# Patient Record
Sex: MALE | Race: WHITE | NOT HISPANIC OR LATINO | Employment: FULL TIME | URBAN - METROPOLITAN AREA
[De-identification: names, ages, dates, MRNs, and addresses within clinical notes are randomized per-mention and may not be internally consistent; named-entity substitution may affect disease eponyms.]

---

## 2017-10-23 ENCOUNTER — GENERIC CONVERSION - ENCOUNTER (OUTPATIENT)
Dept: OTHER | Facility: OTHER | Age: 57
End: 2017-10-23

## 2017-11-20 ENCOUNTER — ALLSCRIPTS OFFICE VISIT (OUTPATIENT)
Dept: OTHER | Facility: OTHER | Age: 57
End: 2017-11-20

## 2017-11-20 DIAGNOSIS — R39.15 URGENCY OF URINATION: ICD-10-CM

## 2017-11-20 DIAGNOSIS — E66.9 OBESITY: ICD-10-CM

## 2017-11-20 DIAGNOSIS — Z12.5 ENCOUNTER FOR SCREENING FOR MALIGNANT NEOPLASM OF PROSTATE: ICD-10-CM

## 2017-11-20 DIAGNOSIS — N40.0 ENLARGED PROSTATE WITHOUT LOWER URINARY TRACT SYMPTOMS (LUTS): ICD-10-CM

## 2017-11-20 DIAGNOSIS — E78.1 PURE HYPERGLYCERIDEMIA: ICD-10-CM

## 2017-11-20 LAB
BILIRUB UR QL STRIP: NEGATIVE
CLARITY UR: NORMAL
COLOR UR: YELLOW
GLUCOSE (HISTORICAL): NEGATIVE
HGB UR QL STRIP.AUTO: NORMAL
KETONES UR STRIP-MCNC: NEGATIVE MG/DL
LEUKOCYTE ESTERASE UR QL STRIP: NEGATIVE
NITRITE UR QL STRIP: NEGATIVE
PH UR STRIP.AUTO: 6 [PH]
PROT UR STRIP-MCNC: NEGATIVE MG/DL
SP GR UR STRIP.AUTO: 1.02
UROBILINOGEN UR QL STRIP.AUTO: 0.2

## 2017-11-30 ENCOUNTER — APPOINTMENT (OUTPATIENT)
Dept: LAB | Facility: HOSPITAL | Age: 57
End: 2017-11-30
Attending: INTERNAL MEDICINE
Payer: COMMERCIAL

## 2017-11-30 DIAGNOSIS — R39.15 URGENCY OF URINATION: ICD-10-CM

## 2017-11-30 DIAGNOSIS — E78.1 PURE HYPERGLYCERIDEMIA: ICD-10-CM

## 2017-11-30 DIAGNOSIS — E66.9 OBESITY: ICD-10-CM

## 2017-11-30 DIAGNOSIS — N40.0 ENLARGED PROSTATE WITHOUT LOWER URINARY TRACT SYMPTOMS (LUTS): ICD-10-CM

## 2017-11-30 DIAGNOSIS — Z12.5 ENCOUNTER FOR SCREENING FOR MALIGNANT NEOPLASM OF PROSTATE: ICD-10-CM

## 2017-11-30 LAB
ALBUMIN SERPL BCP-MCNC: 4 G/DL (ref 3.5–5)
ALP SERPL-CCNC: 66 U/L (ref 46–116)
ALT SERPL W P-5'-P-CCNC: 29 U/L (ref 12–78)
ANION GAP SERPL CALCULATED.3IONS-SCNC: 4 MMOL/L (ref 4–13)
AST SERPL W P-5'-P-CCNC: 23 U/L (ref 5–45)
BILIRUB SERPL-MCNC: 0.55 MG/DL (ref 0.2–1)
BUN SERPL-MCNC: 17 MG/DL (ref 5–25)
CALCIUM SERPL-MCNC: 9.5 MG/DL (ref 8.3–10.1)
CHLORIDE SERPL-SCNC: 107 MMOL/L (ref 100–108)
CHOLEST SERPL-MCNC: 169 MG/DL (ref 50–200)
CO2 SERPL-SCNC: 30 MMOL/L (ref 21–32)
CREAT SERPL-MCNC: 1.19 MG/DL (ref 0.6–1.3)
ERYTHROCYTE [DISTWIDTH] IN BLOOD BY AUTOMATED COUNT: 13.9 % (ref 11.6–15.1)
EST. AVERAGE GLUCOSE BLD GHB EST-MCNC: 114 MG/DL
GFR SERPL CREATININE-BSD FRML MDRD: 66 ML/MIN/1.73SQ M
GLUCOSE P FAST SERPL-MCNC: 94 MG/DL (ref 65–99)
HBA1C MFR BLD: 5.6 % (ref 4.2–6.3)
HCT VFR BLD AUTO: 45.6 % (ref 36.5–49.3)
HDLC SERPL-MCNC: 45 MG/DL (ref 40–60)
HGB BLD-MCNC: 15.4 G/DL (ref 12–17)
LDLC SERPL CALC-MCNC: 99 MG/DL (ref 0–100)
MCH RBC QN AUTO: 28.6 PG (ref 26.8–34.3)
MCHC RBC AUTO-ENTMCNC: 33.8 G/DL (ref 31.4–37.4)
MCV RBC AUTO: 85 FL (ref 82–98)
PLATELET # BLD AUTO: 230 THOUSANDS/UL (ref 149–390)
PMV BLD AUTO: 11.4 FL (ref 8.9–12.7)
POTASSIUM SERPL-SCNC: 5 MMOL/L (ref 3.5–5.3)
PROT SERPL-MCNC: 7 G/DL (ref 6.4–8.2)
PSA SERPL-MCNC: 0.9 NG/ML (ref 0–4)
RBC # BLD AUTO: 5.38 MILLION/UL (ref 3.88–5.62)
SODIUM SERPL-SCNC: 141 MMOL/L (ref 136–145)
TRIGL SERPL-MCNC: 123 MG/DL
WBC # BLD AUTO: 6.95 THOUSAND/UL (ref 4.31–10.16)

## 2017-11-30 PROCEDURE — 80053 COMPREHEN METABOLIC PANEL: CPT

## 2017-11-30 PROCEDURE — 36415 COLL VENOUS BLD VENIPUNCTURE: CPT

## 2017-11-30 PROCEDURE — 85027 COMPLETE CBC AUTOMATED: CPT

## 2017-11-30 PROCEDURE — 83036 HEMOGLOBIN GLYCOSYLATED A1C: CPT

## 2017-11-30 PROCEDURE — G0103 PSA SCREENING: HCPCS

## 2017-11-30 PROCEDURE — 80061 LIPID PANEL: CPT

## 2018-01-03 ENCOUNTER — GENERIC CONVERSION - ENCOUNTER (OUTPATIENT)
Dept: OTHER | Facility: OTHER | Age: 58
End: 2018-01-03

## 2018-01-14 VITALS
TEMPERATURE: 98.1 F | DIASTOLIC BLOOD PRESSURE: 80 MMHG | OXYGEN SATURATION: 98 % | BODY MASS INDEX: 38.66 KG/M2 | SYSTOLIC BLOOD PRESSURE: 116 MMHG | HEIGHT: 69 IN | HEART RATE: 68 BPM | WEIGHT: 261 LBS

## 2018-01-22 VITALS
DIASTOLIC BLOOD PRESSURE: 78 MMHG | SYSTOLIC BLOOD PRESSURE: 124 MMHG | HEIGHT: 69 IN | BODY MASS INDEX: 38.95 KG/M2 | TEMPERATURE: 98.6 F | OXYGEN SATURATION: 98 % | WEIGHT: 263 LBS | HEART RATE: 60 BPM

## 2018-01-24 VITALS
WEIGHT: 272.5 LBS | TEMPERATURE: 98.5 F | SYSTOLIC BLOOD PRESSURE: 120 MMHG | OXYGEN SATURATION: 96 % | HEIGHT: 69 IN | HEART RATE: 73 BPM | DIASTOLIC BLOOD PRESSURE: 72 MMHG | BODY MASS INDEX: 40.36 KG/M2

## 2018-01-28 ENCOUNTER — OFFICE VISIT (OUTPATIENT)
Dept: URGENT CARE | Age: 58
End: 2018-01-28
Payer: COMMERCIAL

## 2018-01-28 VITALS
DIASTOLIC BLOOD PRESSURE: 84 MMHG | TEMPERATURE: 98.2 F | SYSTOLIC BLOOD PRESSURE: 150 MMHG | HEIGHT: 70 IN | OXYGEN SATURATION: 96 % | RESPIRATION RATE: 20 BRPM | BODY MASS INDEX: 38.51 KG/M2 | WEIGHT: 269 LBS | HEART RATE: 62 BPM

## 2018-01-28 DIAGNOSIS — J20.9 ACUTE BRONCHITIS, UNSPECIFIED ORGANISM: Primary | ICD-10-CM

## 2018-01-28 PROCEDURE — S9083 URGENT CARE CENTER GLOBAL: HCPCS | Performed by: FAMILY MEDICINE

## 2018-01-28 PROCEDURE — G0382 LEV 3 HOSP TYPE B ED VISIT: HCPCS | Performed by: FAMILY MEDICINE

## 2018-01-28 RX ORDER — ALBUTEROL SULFATE 90 UG/1
2 AEROSOL, METERED RESPIRATORY (INHALATION) EVERY 6 HOURS PRN
Qty: 1 INHALER | Refills: 0 | Status: SHIPPED | OUTPATIENT
Start: 2018-01-28 | End: 2018-06-15 | Stop reason: ALTCHOICE

## 2018-01-28 RX ORDER — AZITHROMYCIN 250 MG/1
TABLET, FILM COATED ORAL
Qty: 6 TABLET | Refills: 0 | Status: SHIPPED | OUTPATIENT
Start: 2018-01-28 | End: 2018-02-01

## 2018-01-28 RX ORDER — TAMSULOSIN HYDROCHLORIDE 0.4 MG/1
1 CAPSULE ORAL
COMMUNITY
Start: 2017-11-20 | End: 2018-06-15 | Stop reason: ALTCHOICE

## 2018-01-28 RX ORDER — CYCLOBENZAPRINE HCL 5 MG
1 TABLET ORAL 3 TIMES DAILY PRN
COMMUNITY
Start: 2018-01-03 | End: 2018-06-15 | Stop reason: ALTCHOICE

## 2018-01-28 RX ORDER — ASPIRIN 81 MG/1
TABLET, CHEWABLE ORAL
COMMUNITY
End: 2018-07-30 | Stop reason: SDUPTHER

## 2018-01-28 NOTE — PROGRESS NOTES
Assessment/Plan:      Diagnoses and all orders for this visit:    Acute bronchitis, unspecified organism  -     azithromycin (ZITHROMAX) 250 mg tablet; Take 2 tablets day 1 then 1 tab days 2-5  -     albuterol (PROVENTIL HFA,VENTOLIN HFA) 90 mcg/act inhaler; Inhale 2 puffs every 6 (six) hours as needed for wheezing    Other orders  -     aspirin 81 mg chewable tablet; Chew  -     Multiple Vitamins-Minerals ([facility] DAILY MULTIPLE FOR MEN PO); Take by mouth  -     cyclobenzaprine (FLEXERIL) 5 mg tablet; Take 1 tablet by mouth 3 (three) times a day as needed  -     tamsulosin (FLOMAX) 0 4 mg; Take 1 capsule by mouth          Subjective:     Patient ID: Isidro Hayes is a 59 y o  male  Patient is here for evaluation of cough, chest congestion which started on Thursday in getting worse  Patient bring up thick green yellow sputum  He denies any fevers, chills, body aches, shortness of breath, sinus pressure  Patient does have some discomfort in both ears left greater than right  Review of Systems   Constitutional: Negative  HENT: Positive for ear pain  Eyes: Negative  Respiratory: Positive for cough and wheezing  Negative for choking, shortness of breath and stridor  Cardiovascular: Negative  Objective:     Physical Exam   Constitutional: He is oriented to person, place, and time  He appears well-developed and well-nourished  No distress  HENT:   Head: Normocephalic and atraumatic  Eyes: Conjunctivae and EOM are normal  Pupils are equal, round, and reactive to light  Right eye exhibits no discharge  Left eye exhibits no discharge  Neck: Normal range of motion  Neck supple  Cardiovascular: Normal rate, regular rhythm and normal heart sounds  Pulmonary/Chest: Effort normal  No respiratory distress  He has wheezes  He has no rales  He exhibits no tenderness  Neurological: He is alert and oriented to person, place, and time  Skin: He is not diaphoretic     Nursing note and vitals reviewed

## 2018-01-28 NOTE — PATIENT INSTRUCTIONS
Acute Bronchitis   WHAT YOU NEED TO KNOW:   Acute bronchitis is swelling and irritation in the air passages of your lungs  This irritation may cause you to cough or have other breathing problems  Acute bronchitis often starts because of another illness, such as a cold or the flu  The illness spreads from your nose and throat to your windpipe and airways  Bronchitis is often called a chest cold  Acute bronchitis lasts about 3 to 6 weeks and is usually not a serious illness  Your cough can last for several weeks  DISCHARGE INSTRUCTIONS:   Return to the emergency department if:   · You cough up blood  · Your lips or fingernails turn blue  · You feel like you are not getting enough air when you breathe  Contact your healthcare provider if:   · You have a fever  · Your breathing problems do not go away or get worse  · Your cough does not get better within 4 weeks  · You have questions or concerns about your condition or care  Self-care:   · Get more rest   Rest helps your body to heal  Slowly start to do more each day  Rest when you feel it is needed  · Avoid irritants in the air  Avoid chemicals, fumes, and dust  Wear a face mask if you must work around dust or fumes  Stay inside on days when air pollution levels are high  If you have allergies, stay inside when pollen counts are high  Do not use aerosol products, such as spray-on deodorant, bug spray, and hair spray  · Do not smoke or be around others who smoke  Nicotine and other chemicals in cigarettes and cigars damages the cilia that move mucus out of your lungs  Ask your healthcare provider for information if you currently smoke and need help to quit  E-cigarettes or smokeless tobacco still contain nicotine  Talk to your healthcare provider before you use these products  · Drink liquids as directed  Liquids help keep your air passages moist and help you cough up mucus   You may need to drink more liquids when you have acute bronchitis  Ask how much liquid to drink each day and which liquids are best for you  · Use a humidifier or vaporizer  Use a cool mist humidifier or a vaporizer to increase air moisture in your home  This may make it easier for you to breathe and help decrease your cough  Decrease risk for acute bronchitis:   · Get the vaccinations you need  Ask your healthcare provider if you should get vaccinated against the flu or pneumonia  · Prevent the spread of germs  You can decrease your risk of acute bronchitis and other illnesses by doing the following:     [facility] your hands often with soap and water  Carry germ-killing hand lotion or gel with you  You can use the lotion or gel to clean your hands when soap and water are not available  ¨ Do not touch your eyes, nose, or mouth unless you have washed your hands first     ¨ Always cover your mouth when you cough to prevent the spread of germs  It is best to cough into a tissue or your shirt sleeve instead of into your hand  Ask those around you cover their mouths when they cough  ¨ Try to avoid people who have a cold or the flu  If you are sick, stay away from others as much as possible  Medicines: Your healthcare provider may  give you any of the following:  · Ibuprofen or acetaminophen  are medicines that help lower your fever  They are available without a doctor's order  Ask your healthcare provider which medicine is right for you  Ask how much to take and how often to take it  Follow directions  These medicines can cause stomach bleeding if not taken correctly  Ibuprofen can cause kidney damage  Do not take ibuprofen if you have kidney disease, an ulcer, or allergies to aspirin  Acetaminophen can cause liver damage  Do not take more than 4,000 milligrams in 24 hours  · Decongestants  help loosen mucus in your lungs and make it easier to cough up  This can help you breathe easier  · Cough suppressants  decrease your urge to cough   If your cough produces mucus, do not take a cough suppressant unless your healthcare provider tells you to  Your healthcare provider may suggest that you take a cough suppressant at night so you can rest     · Inhalers  may be given  Your healthcare provider may give you one or more inhalers to help you breathe easier and cough less  An inhaler gives your medicine to open your airways  Ask your healthcare provider to show you how to use your inhaler correctly  · Take your medicine as directed  Contact your healthcare provider if you think your medicine is not helping or if you have side effects  Tell him of her if you are allergic to any medicine  Keep a list of the medicines, vitamins, and herbs you take  Include the amounts, and when and why you take them  Bring the list or the pill bottles to follow-up visits  Carry your medicine list with you in case of an emergency  Follow up with your healthcare provider as directed:  Write down questions you have so you will remember to ask them during your follow-up visits  © 2017 [facility] Information is for End User's use only and may not be sold, redistributed or otherwise used for commercial purposes  All illustrations and images included in CareNotes® are the copyrighted property of [employer]  or [employer]  The above information is an  only  It is not intended as medical advice for individual conditions or treatments  Talk to your doctor, nurse or pharmacist before following any medical regimen to see if it is safe and effective for you  Follow-up with your primary care provider in 3-4 days if symptoms are persisting  If symptoms are worsening go to emergency room for further evaluation  Take probiotics as directed

## 2018-05-31 ENCOUNTER — OFFICE VISIT (OUTPATIENT)
Dept: INTERNAL MEDICINE CLINIC | Facility: CLINIC | Age: 58
End: 2018-05-31
Payer: COMMERCIAL

## 2018-05-31 VITALS
BODY MASS INDEX: 38.48 KG/M2 | TEMPERATURE: 98.4 F | HEIGHT: 70 IN | RESPIRATION RATE: 20 BRPM | OXYGEN SATURATION: 95 % | DIASTOLIC BLOOD PRESSURE: 76 MMHG | SYSTOLIC BLOOD PRESSURE: 130 MMHG | HEART RATE: 67 BPM | WEIGHT: 268.8 LBS

## 2018-05-31 DIAGNOSIS — M25.462 SWELLING OF JOINT, KNEE, LEFT: ICD-10-CM

## 2018-05-31 DIAGNOSIS — M25.562 ACUTE PAIN OF LEFT KNEE: Primary | ICD-10-CM

## 2018-05-31 PROCEDURE — 99213 OFFICE O/P EST LOW 20 MIN: CPT | Performed by: NURSE PRACTITIONER

## 2018-05-31 RX ORDER — MELOXICAM 15 MG/1
15 TABLET ORAL DAILY
Qty: 30 TABLET | Refills: 0 | Status: SHIPPED | OUTPATIENT
Start: 2018-05-31 | End: 2018-06-15

## 2018-05-31 NOTE — PROGRESS NOTES
Assessment/Plan:     Diagnoses and all orders for this visit:    Acute pain of left knee  -     XR knee 3 vw left non injury; Future  -     meloxicam (MOBIC) 15 mg tablet; Take 1 tablet (15 mg total) by mouth daily    Swelling of joint, knee, left  -     XR knee 3 vw left non injury; Future  -     meloxicam (MOBIC) 15 mg tablet; Take 1 tablet (15 mg total) by mouth daily      Advised pt to start with xrays, mobic ice and elevation  Advised that he may need knee drained if it worsens, do not feel it is necessary today  No s/sx of infection  Likely secondary to OA  Scheduled to follow up in 2 weeks with Dr Ankush Bafrield  May benefit from joint injection  Subjective:      Patient ID: Kamran Martinez is a 59 y o  male  HPI    Patient is here today c/o left knee pain x 2 days  Symptoms include pain and swelling  Pain is described constant dull ache mostly inferior to the patella and then some pain above the knee  "it feels super tight" He took 2 aleve yesterday and 2 today without relief  He used ice last night which gave him temporary relief  Of note he has had meniscus surgery on his left knee twice in the past  He states he has OA throughout his body but is nonspecific  He has been working on his deck recently and wearing knee pads but doing a lot of kneeling and thinks this is what provoked the pain  The pain is the worst in the morning  It starts to feel a little better as he uses it and loosens up  He does report an intermittent "clicking" with the knee  No popping, weakness, locking, or feeling of the knee giving out  The following portions of the patient's history were reviewed and updated as appropriate: allergies, current medications, past family history, past medical history, past social history, past surgical history and problem list     Review of Systems   Constitutional: Negative for activity change, appetite change, chills, diaphoresis, fatigue and fever     Musculoskeletal: Positive for arthralgias (left knee), gait problem and joint swelling (left knee)  Negative for back pain and myalgias  Skin: Negative for rash  Past Medical History:   Diagnosis Date    Arthritis     Diverticulosis     History of colonic polyps     Vertigo          Current Outpatient Prescriptions:     albuterol (PROVENTIL HFA,VENTOLIN HFA) 90 mcg/act inhaler, Inhale 2 puffs every 6 (six) hours as needed for wheezing, Disp: 1 Inhaler, Rfl: 0    aspirin 81 mg chewable tablet, Chew, Disp: , Rfl:     Multiple Vitamins-Minerals ([facility] DAILY MULTIPLE FOR MEN PO), Take by mouth, Disp: , Rfl:     cyclobenzaprine (FLEXERIL) 5 mg tablet, Take 1 tablet by mouth 3 (three) times a day as needed, Disp: , Rfl:     tamsulosin (FLOMAX) 0 4 mg, Take 1 capsule by mouth, Disp: , Rfl:     Allergies   Allergen Reactions    Pollen Extract        Social History   Past Surgical History:   Procedure Laterality Date    HERNIA REPAIR      KNEE SURGERY       Family History   Problem Relation Age of Onset    Adopted: Yes    No Known Problems Mother        Objective:  /76 (BP Location: Left arm, Patient Position: Sitting, Cuff Size: Large)   Pulse 67   Temp 98 4 °F (36 9 °C) (Oral)   Resp 20   Ht 5' 9 5" (1 765 m)   Wt 122 kg (268 lb 12 8 oz)   SpO2 95%   BMI 39 13 kg/m²      Physical Exam   Constitutional: He is oriented to person, place, and time  He appears well-developed and well-nourished  No distress  obese   HENT:   Head: Normocephalic and atraumatic  Cardiovascular: Normal rate, regular rhythm and normal heart sounds  No murmur heard  Pulmonary/Chest: Effort normal and breath sounds normal  No respiratory distress  He has no wheezes  Musculoskeletal:        Left knee: He exhibits swelling  He exhibits normal range of motion, no ecchymosis, no erythema, normal alignment, no LCL laxity and no MCL laxity  Tenderness found  Medial joint line and lateral joint line tenderness noted  Thessaly test negative  Neurological: He is alert and oriented to person, place, and time  Skin: Skin is warm and dry  No rash noted  He is not diaphoretic  No erythema  Psychiatric: He has a normal mood and affect  His behavior is normal    Vitals reviewed

## 2018-05-31 NOTE — PATIENT INSTRUCTIONS
Likely arthritis  Go for xray  Start meloxicam daily with food  Ice knee 20 min on 20 min off   Follow up 1-2 weeks

## 2018-06-01 ENCOUNTER — APPOINTMENT (OUTPATIENT)
Dept: RADIOLOGY | Age: 58
End: 2018-06-01
Payer: COMMERCIAL

## 2018-06-01 DIAGNOSIS — M25.562 ACUTE PAIN OF LEFT KNEE: ICD-10-CM

## 2018-06-01 DIAGNOSIS — M25.462 SWELLING OF JOINT, KNEE, LEFT: ICD-10-CM

## 2018-06-01 PROCEDURE — 73562 X-RAY EXAM OF KNEE 3: CPT

## 2018-06-04 ENCOUNTER — OFFICE VISIT (OUTPATIENT)
Dept: INTERNAL MEDICINE CLINIC | Facility: CLINIC | Age: 58
End: 2018-06-04
Payer: COMMERCIAL

## 2018-06-04 VITALS
WEIGHT: 268.4 LBS | HEART RATE: 64 BPM | HEIGHT: 70 IN | SYSTOLIC BLOOD PRESSURE: 126 MMHG | OXYGEN SATURATION: 98 % | TEMPERATURE: 98.7 F | DIASTOLIC BLOOD PRESSURE: 80 MMHG | BODY MASS INDEX: 38.42 KG/M2

## 2018-06-04 DIAGNOSIS — M25.562 ACUTE PAIN OF LEFT KNEE: Primary | ICD-10-CM

## 2018-06-04 PROCEDURE — 20610 DRAIN/INJ JOINT/BURSA W/O US: CPT

## 2018-06-04 RX ORDER — KETOROLAC TROMETHAMINE 30 MG/ML
60 INJECTION, SOLUTION INTRAMUSCULAR; INTRAVENOUS ONCE
Status: COMPLETED | OUTPATIENT
Start: 2018-06-04 | End: 2018-06-04

## 2018-06-04 RX ORDER — METHYLPREDNISOLONE ACETATE 40 MG/ML
40 INJECTION, SUSPENSION INTRA-ARTICULAR; INTRALESIONAL; INTRAMUSCULAR; SOFT TISSUE ONCE
Status: COMPLETED | OUTPATIENT
Start: 2018-06-04 | End: 2018-06-04

## 2018-06-04 RX ORDER — BUPIVACAINE HYDROCHLORIDE 2.5 MG/ML
1 INJECTION, SOLUTION INFILTRATION; PERINEURAL
Status: COMPLETED | OUTPATIENT
Start: 2018-06-04 | End: 2018-06-04

## 2018-06-04 RX ORDER — LIDOCAINE HYDROCHLORIDE 10 MG/ML
1 INJECTION, SOLUTION INFILTRATION; PERINEURAL
Status: COMPLETED | OUTPATIENT
Start: 2018-06-04 | End: 2018-06-04

## 2018-06-04 RX ORDER — METHYLPREDNISOLONE ACETATE 40 MG/ML
1 INJECTION, SUSPENSION INTRA-ARTICULAR; INTRALESIONAL; INTRAMUSCULAR; SOFT TISSUE
Status: COMPLETED | OUTPATIENT
Start: 2018-06-04 | End: 2018-06-04

## 2018-06-04 RX ORDER — LIDOCAINE HYDROCHLORIDE 10 MG/ML
1 INJECTION, SOLUTION EPIDURAL; INFILTRATION; INTRACAUDAL; PERINEURAL ONCE
Status: COMPLETED | OUTPATIENT
Start: 2018-06-04 | End: 2018-06-04

## 2018-06-04 RX ORDER — BUPIVACAINE HYDROCHLORIDE 2.5 MG/ML
1 INJECTION, SOLUTION INFILTRATION; PERINEURAL ONCE
Status: COMPLETED | OUTPATIENT
Start: 2018-06-04 | End: 2018-06-04

## 2018-06-04 RX ADMIN — LIDOCAINE HYDROCHLORIDE 5 ML: 10 INJECTION, SOLUTION EPIDURAL; INFILTRATION; INTRACAUDAL; PERINEURAL at 12:51

## 2018-06-04 RX ADMIN — BUPIVACAINE HYDROCHLORIDE 1 ML: 2.5 INJECTION, SOLUTION INFILTRATION; PERINEURAL at 14:51

## 2018-06-04 RX ADMIN — METHYLPREDNISOLONE ACETATE 1 ML: 40 INJECTION, SUSPENSION INTRA-ARTICULAR; INTRALESIONAL; INTRAMUSCULAR; SOFT TISSUE at 14:51

## 2018-06-04 RX ADMIN — METHYLPREDNISOLONE ACETATE 40 MG: 40 INJECTION, SUSPENSION INTRA-ARTICULAR; INTRALESIONAL; INTRAMUSCULAR; SOFT TISSUE at 12:53

## 2018-06-04 RX ADMIN — LIDOCAINE HYDROCHLORIDE 1 ML: 10 INJECTION, SOLUTION INFILTRATION; PERINEURAL at 14:51

## 2018-06-04 RX ADMIN — KETOROLAC TROMETHAMINE 60 MG: 30 INJECTION, SOLUTION INTRAMUSCULAR; INTRAVENOUS at 12:50

## 2018-06-04 RX ADMIN — BUPIVACAINE HYDROCHLORIDE 1 ML: 2.5 INJECTION, SOLUTION INFILTRATION; PERINEURAL at 12:48

## 2018-06-15 ENCOUNTER — OFFICE VISIT (OUTPATIENT)
Dept: INTERNAL MEDICINE CLINIC | Facility: CLINIC | Age: 58
End: 2018-06-15
Payer: COMMERCIAL

## 2018-06-15 VITALS
WEIGHT: 261.8 LBS | HEIGHT: 70 IN | HEART RATE: 61 BPM | SYSTOLIC BLOOD PRESSURE: 118 MMHG | BODY MASS INDEX: 37.48 KG/M2 | RESPIRATION RATE: 20 BRPM | TEMPERATURE: 98.4 F | DIASTOLIC BLOOD PRESSURE: 82 MMHG | OXYGEN SATURATION: 95 %

## 2018-06-15 DIAGNOSIS — Z12.11 SCREENING FOR COLON CANCER: Primary | ICD-10-CM

## 2018-06-15 DIAGNOSIS — M17.32 POST-TRAUMATIC OSTEOARTHRITIS OF LEFT KNEE: ICD-10-CM

## 2018-06-15 DIAGNOSIS — E78.1 HYPERTRIGLYCERIDEMIA: ICD-10-CM

## 2018-06-15 DIAGNOSIS — E66.09 CLASS 2 OBESITY DUE TO EXCESS CALORIES WITHOUT SERIOUS COMORBIDITY WITH BODY MASS INDEX (BMI) OF 37.0 TO 37.9 IN ADULT: ICD-10-CM

## 2018-06-15 DIAGNOSIS — N40.0 BENIGN PROSTATIC HYPERPLASIA WITHOUT LOWER URINARY TRACT SYMPTOMS: ICD-10-CM

## 2018-06-15 PROBLEM — E66.9 OBESITY: Status: ACTIVE | Noted: 2017-11-15

## 2018-06-15 PROBLEM — M25.562 ACUTE PAIN OF LEFT KNEE: Status: RESOLVED | Noted: 2018-05-31 | Resolved: 2018-06-15

## 2018-06-15 PROBLEM — N52.9 ERECTILE DYSFUNCTION: Status: ACTIVE | Noted: 2017-11-15

## 2018-06-15 PROBLEM — G89.29 CHRONIC RIGHT-SIDED LOW BACK PAIN WITHOUT SCIATICA: Status: ACTIVE | Noted: 2017-11-20

## 2018-06-15 PROBLEM — J20.9 ACUTE BRONCHITIS: Status: RESOLVED | Noted: 2018-01-28 | Resolved: 2018-06-15

## 2018-06-15 PROBLEM — M25.462 SWELLING OF JOINT, KNEE, LEFT: Status: RESOLVED | Noted: 2018-05-31 | Resolved: 2018-06-15

## 2018-06-15 PROBLEM — M54.50 CHRONIC RIGHT-SIDED LOW BACK PAIN WITHOUT SCIATICA: Status: ACTIVE | Noted: 2017-11-20

## 2018-06-15 PROCEDURE — 99213 OFFICE O/P EST LOW 20 MIN: CPT | Performed by: INTERNAL MEDICINE

## 2018-06-15 NOTE — ASSESSMENT & PLAN NOTE
Significant clinical improvement in left knee function  Patient states it is 85%  There is no effusion  There is no tenderness  Patient should return to the office for re-evaluation and possible repeat intra-articular steroid if knee pain should worsen

## 2018-06-15 NOTE — PROGRESS NOTES
Assessment/Plan:    Post-traumatic osteoarthritis of left knee    Significant clinical improvement in left knee function  Patient states it is 85%  There is no effusion  There is no tenderness  Patient should return to the office for re-evaluation and possible repeat intra-articular steroid if knee pain should worsen  Diagnoses and all orders for this visit:    Screening for colon cancer  -     Ambulatory referral to Gastroenterology; Future  -     CBC; Future  -     Comprehensive metabolic panel; Future    Hypertriglyceridemia  -     CBC; Future  -     Comprehensive metabolic panel; Future    Post-traumatic osteoarthritis of left knee    Benign prostatic hyperplasia without lower urinary tract symptoms    Class 2 obesity due to excess calories without serious comorbidity with body mass index (BMI) of 37 0 to 37 9 in adult          Subjective:      Patient ID: Roxanne Griffin is a 59 y o  male  Patient presents to the office for follow-up visit regarding degenerative osteoarthritis of his left knee  He is status post intra-articular steroid injection  He states that his knee feels much better  His pain has decreased  There is no swelling  He rates his knee at being 85% of normal and is pleased with the results  Otherwise he offers no complaints  He realizes he is due for a colonoscopy sometime in the near future  His last blood work was 6 months ago was essentially normal   He has had no issues with any back pain or sciatic symptoms  His urinary problems have resolved  Medications were reviewed  The only medications the patient is taking at the present time is a baby aspirin and a multivitamin  Family History   Problem Relation Age of Onset    Adopted: Yes    No Known Problems Mother      Social History     Social History    Marital status: Single     Spouse name: N/A    Number of children: N/A    Years of education: N/A     Occupational History    Not on file       Social History Main Topics    Smoking status: Former Smoker    Smokeless tobacco: Never Used      Comment: Former smoker per [employer]    Alcohol use Yes      Comment: social / Occasional     Drug use: No    Sexual activity: Not on file     Other Topics Concern    Not on file     Social History Narrative    No narrative on file     Past Medical History:   Diagnosis Date    Arthritis     Diverticulosis     History of colonic polyps     Vertigo        Current Outpatient Prescriptions:     aspirin 81 mg chewable tablet, Chew, Disp: , Rfl:     Multiple Vitamins-Minerals ([facility] DAILY MULTIPLE FOR MEN PO), Take by mouth, Disp: , Rfl:   Allergies   Allergen Reactions    Pollen Extract      Past Surgical History:   Procedure Laterality Date    HERNIA REPAIR      KNEE SURGERY           Review of Systems   Constitutional: Negative  HENT: Negative  Eyes: Negative  Respiratory: Negative  Cardiovascular: Negative  Gastrointestinal: Negative  Endocrine: Negative  Genitourinary: Negative  Musculoskeletal: Positive for arthralgias (Much improved left knee pain)  Skin: Negative  Allergic/Immunologic: Negative  Neurological: Negative  Hematological: Negative  Psychiatric/Behavioral: Negative  Objective:      /82 (BP Location: Left arm, Patient Position: Sitting, Cuff Size: Large)   Pulse 61   Temp 98 4 °F (36 9 °C) (Oral)   Resp 20   Ht 5' 10" (1 778 m)   Wt 119 kg (261 lb 12 8 oz)   SpO2 95%   BMI 37 56 kg/m²          Physical Exam   Constitutional: He is oriented to person, place, and time  He appears well-developed and well-nourished  HENT:   Head: Normocephalic and atraumatic  Eyes: Pupils are equal, round, and reactive to light  Neck: No JVD present  No tracheal deviation present  Cardiovascular:   Unremarkable   Pulmonary/Chest: Effort normal  No respiratory distress  He has no wheezes  Abdominal: He exhibits no distension     Musculoskeletal: He exhibits no edema, tenderness or deformity  Neurological: He is alert and oriented to person, place, and time  Grossly, no significant motor deficits   Skin: Skin is warm and dry  Psychiatric: He has a normal mood and affect  Thought content normal    Vitals reviewed

## 2018-06-15 NOTE — PATIENT INSTRUCTIONS
Patient given a prescription for follow-up CBC and CMP  A follow-up visit was scheduled for 6 months  Patient was advised to contact the office again should his left knee pain recur

## 2018-07-27 ENCOUNTER — CLINICAL SUPPORT (OUTPATIENT)
Dept: INTERNAL MEDICINE CLINIC | Facility: CLINIC | Age: 58
End: 2018-07-27
Payer: COMMERCIAL

## 2018-07-27 DIAGNOSIS — Z12.11 SCREENING FOR COLON CANCER: Primary | ICD-10-CM

## 2018-07-27 DIAGNOSIS — E78.1 HYPERTRIGLYCERIDEMIA: ICD-10-CM

## 2018-07-27 PROCEDURE — 36415 COLL VENOUS BLD VENIPUNCTURE: CPT

## 2018-07-28 LAB
ALBUMIN SERPL-MCNC: 4.4 G/DL (ref 3.5–5.5)
ALBUMIN/GLOB SERPL: 1.9 {RATIO} (ref 1.2–2.2)
ALP SERPL-CCNC: 72 IU/L (ref 39–117)
ALT SERPL-CCNC: 21 IU/L (ref 0–44)
AST SERPL-CCNC: 25 IU/L (ref 0–40)
BILIRUB SERPL-MCNC: 0.3 MG/DL (ref 0–1.2)
BUN SERPL-MCNC: 21 MG/DL (ref 6–24)
BUN/CREAT SERPL: 19 (ref 9–20)
CALCIUM SERPL-MCNC: 9.5 MG/DL (ref 8.7–10.2)
CHLORIDE SERPL-SCNC: 103 MMOL/L (ref 96–106)
CO2 SERPL-SCNC: 24 MMOL/L (ref 20–29)
CREAT SERPL-MCNC: 1.09 MG/DL (ref 0.76–1.27)
ERYTHROCYTE [DISTWIDTH] IN BLOOD BY AUTOMATED COUNT: 14.3 % (ref 12.3–15.4)
GLOBULIN SER-MCNC: 2.3 G/DL (ref 1.5–4.5)
GLUCOSE SERPL-MCNC: 105 MG/DL (ref 65–99)
HCT VFR BLD AUTO: 46.4 % (ref 37.5–51)
HGB BLD-MCNC: 15.4 G/DL (ref 13–17.7)
MCH RBC QN AUTO: 28.8 PG (ref 26.6–33)
MCHC RBC AUTO-ENTMCNC: 33.2 G/DL (ref 31.5–35.7)
MCV RBC AUTO: 87 FL (ref 79–97)
PLATELET # BLD AUTO: 218 X10E3/UL (ref 150–379)
POTASSIUM SERPL-SCNC: 5.2 MMOL/L (ref 3.5–5.2)
PROT SERPL-MCNC: 6.7 G/DL (ref 6–8.5)
RBC # BLD AUTO: 5.35 X10E6/UL (ref 4.14–5.8)
SL AMB EGFR AFRICAN AMERICAN: 85 ML/MIN/1.73
SL AMB EGFR NON AFRICAN AMERICAN: 74 ML/MIN/1.73
SODIUM SERPL-SCNC: 143 MMOL/L (ref 134–144)
WBC # BLD AUTO: 6.7 X10E3/UL (ref 3.4–10.8)

## 2018-07-30 ENCOUNTER — OFFICE VISIT (OUTPATIENT)
Dept: INTERNAL MEDICINE CLINIC | Age: 58
End: 2018-07-30
Payer: COMMERCIAL

## 2018-07-30 VITALS
HEART RATE: 62 BPM | BODY MASS INDEX: 39.34 KG/M2 | TEMPERATURE: 98.4 F | OXYGEN SATURATION: 95 % | DIASTOLIC BLOOD PRESSURE: 76 MMHG | SYSTOLIC BLOOD PRESSURE: 120 MMHG | HEIGHT: 69 IN | WEIGHT: 265.6 LBS

## 2018-07-30 DIAGNOSIS — T14.8XXA MUSCLE TEAR: Primary | ICD-10-CM

## 2018-07-30 PROCEDURE — 3008F BODY MASS INDEX DOCD: CPT | Performed by: INTERNAL MEDICINE

## 2018-07-30 PROCEDURE — 99213 OFFICE O/P EST LOW 20 MIN: CPT | Performed by: INTERNAL MEDICINE

## 2018-07-30 RX ORDER — ALBUTEROL SULFATE 90 UG/1
AEROSOL, METERED RESPIRATORY (INHALATION)
COMMUNITY
Start: 2015-01-27 | End: 2018-07-30 | Stop reason: ALTCHOICE

## 2018-07-30 RX ORDER — MELOXICAM 15 MG/1
TABLET ORAL EVERY 24 HOURS
COMMUNITY
Start: 2017-02-24 | End: 2018-07-30 | Stop reason: ALTCHOICE

## 2018-07-30 RX ORDER — CYCLOBENZAPRINE HCL 10 MG
TABLET ORAL EVERY 24 HOURS
COMMUNITY
Start: 2016-10-13 | End: 2018-07-30 | Stop reason: ALTCHOICE

## 2018-07-30 NOTE — ASSESSMENT & PLAN NOTE
Recommend applying ice and warm compresses, interchangeably  Also starting a light stretching and exercise  Patient would like to defer on PT at this time

## 2018-07-30 NOTE — PROGRESS NOTES
Assessment/Plan:    Muscle tear  Recommend applying ice and warm compresses, interchangeably  Also starting a light stretching and exercise  Patient would like to defer on PT at this time  Diagnoses and all orders for this visit:    Muscle tear          Subjective:      Patient ID: Arsalan Christopher is a 59 y o  male  59year old male us seen today with acute symptoms of bruising along inner right thigh  Occurred approximately 10-days ago after playing golf  Aggravated injury 7-days ago playing golf again to which he has noticed ecchymosis of the inner right thigh  No vascular compromise or signs/symptoms of compartment syndrome  Groin Pain   The patient's pertinent negatives include no genital injury, genital itching, genital lesions, pelvic pain, penile discharge, penile pain, priapism, scrotal swelling or testicular pain  This is a new problem  The current episode started 1 to 4 weeks ago  The problem occurs daily  The problem has been gradually improving  Pertinent negatives include no abdominal pain, chest pain, chills, constipation, coughing, diarrhea, dysuria, fever, headaches, nausea, shortness of breath, sore throat or vomiting  The symptoms are aggravated by activity  He has tried nothing for the symptoms  The following portions of the patient's history were reviewed and updated as appropriate: allergies, current medications, past family history, past medical history, past social history, past surgical history and problem list     Review of Systems   Constitutional: Negative for activity change, appetite change, chills, diaphoresis, fatigue and fever  HENT: Negative for congestion, postnasal drip, rhinorrhea, sinus pain, sinus pressure, sneezing and sore throat  Eyes: Negative for visual disturbance  Respiratory: Negative for apnea, cough, choking, chest tightness, shortness of breath and wheezing  Cardiovascular: Negative for chest pain, palpitations and leg swelling  Gastrointestinal: Negative for abdominal distention, abdominal pain, anal bleeding, blood in stool, constipation, diarrhea, nausea and vomiting  Endocrine: Negative for cold intolerance and heat intolerance  Genitourinary: Negative for difficulty urinating, discharge, dysuria, hematuria, pelvic pain, penile pain, scrotal swelling and testicular pain  Musculoskeletal: Positive for myalgias  Skin: Negative  Neurological: Negative for dizziness, weakness, light-headedness, numbness and headaches  Hematological: Negative for adenopathy  Psychiatric/Behavioral: Negative for agitation, sleep disturbance and suicidal ideas  All other systems reviewed and are negative  Past Medical History:   Diagnosis Date    Arthritis     Diverticulosis     History of colonic polyps     Vertigo          Current Outpatient Prescriptions:     aspirin 81 MG tablet, take 1 Tablet by Oral route  every day, Disp: , Rfl:     Multiple Vitamins-Minerals (MULTIVITAMIN PO), take 1 Tablet by Oral route  every day, Disp: , Rfl:     Allergies   Allergen Reactions    Pollen Extract        Social History   Past Surgical History:   Procedure Laterality Date    HERNIA REPAIR      KNEE SURGERY       Family History   Problem Relation Age of Onset    Adopted:  Yes    No Known Problems Mother        Objective:  /76 (BP Location: Left arm, Patient Position: Sitting, Cuff Size: Adult)   Pulse 62   Temp 98 4 °F (36 9 °C)   Ht 5' 8 5" (1 74 m)   Wt 120 kg (265 lb 9 6 oz)   SpO2 95% Comment: room air  BMI 39 79 kg/m²     Recent Results (from the past 1344 hour(s))   CBC    Collection Time: 07/27/18  8:27 AM   Result Value Ref Range    [facility] LAB WHITE BLOOD CELL COUNT 6 7 3 4 - 10 8 x10E3/uL    [facility] RED BLOOD CELLS 5 35 4 14 - 5 80 x10E6/uL    Hemoglobin 15 4 13 0 - 17 7 g/dL    Hematocrit 46 4 37 5 - 51 0 %    MCV 87 79 - 97 fL    MCH 28 8 26 6 - 33 0 pg    MCHC 33 2 31 5 - 35 7 g/dL    RDW 14 3 12 3 - 15 4 % Platelet Count 650 806 - 379 x10E3/uL   Comprehensive metabolic panel    Collection Time: 07/27/18  8:27 AM   Result Value Ref Range    [facility] AMB GLUCOSE 105 (H) 65 - 99 mg/dL    BUN 21 6 - 24 mg/dL    Creatinine, Serum 1 09 0 76 - 1 27 mg/dL    eGFR Non  74 >59 mL/min/1 73    SL AMB EGFR AFRICAN AMERICAN 85 >59 mL/min/1 73    [facility] BUN/CREATININE RATIO 19 9 - 20    SL AMB SODIUM 143 134 - 144 mmol/L    SL AMB POTASSIUM 5 2 3 5 - 5 2 mmol/L    SL AMB CHLORIDE 103 96 - 106 mmol/L    SL AMB CARBON DIOXIDE 24 20 - 29 mmol/L    CALCIUM 9 5 8 7 - 10 2 mg/dL    [facility] AMB PROTEIN, TOTAL 6 7 6 0 - 8 5 g/dL    Serum Albumin 4 4 3 5 - 5 5 g/dL    Globulin, Total 2 3 1 5 - 4 5 g/dL    [facility] AMB ALBUMIN/GLOBULIN RATIO 1 9 1 2 - 2 2    SL AMB BILIRUBIN, TOTAL 0 3 0 0 - 1 2 mg/dL    Alk Phos Isoenzymes 72 39 - 117 IU/L    [facility] AMB AST 25 0 - 40 IU/L    SL AMB ALT 21 0 - 44 IU/L            Physical Exam   Constitutional: He is oriented to person, place, and time  He appears well-developed and well-nourished  No distress  HENT:   Head: Normocephalic and atraumatic  Eyes: Conjunctivae and EOM are normal  Pupils are equal, round, and reactive to light  Right eye exhibits no discharge  Left eye exhibits no discharge  No scleral icterus  Neck: Normal range of motion  Neck supple  No JVD present  No thyromegaly present  Cardiovascular: Normal rate, regular rhythm, normal heart sounds and intact distal pulses  Exam reveals no gallop and no friction rub  No murmur heard  Pulmonary/Chest: Effort normal and breath sounds normal  No respiratory distress  He has no wheezes  He has no rales  He exhibits no tenderness  Abdominal: Soft  Bowel sounds are normal  He exhibits no distension and no mass  There is no tenderness  There is no rebound and no guarding  Musculoskeletal: Normal range of motion  He exhibits no edema, tenderness or deformity  Ecchymosis along medial right thigh, mild tenderness to palpation  Lymphadenopathy:     He has no cervical adenopathy  Neurological: He is alert and oriented to person, place, and time  He has normal reflexes  No cranial nerve deficit  Coordination normal    Skin: Skin is warm and dry  No rash noted  He is not diaphoretic  No erythema  No pallor  Psychiatric: He has a normal mood and affect  His behavior is normal  Judgment and thought content normal    Nursing note and vitals reviewed

## 2018-08-14 ENCOUNTER — TELEPHONE (OUTPATIENT)
Dept: INTERNAL MEDICINE CLINIC | Age: 58
End: 2018-08-14

## 2019-02-04 ENCOUNTER — OFFICE VISIT (OUTPATIENT)
Dept: INTERNAL MEDICINE CLINIC | Facility: CLINIC | Age: 59
End: 2019-02-04
Payer: COMMERCIAL

## 2019-02-04 VITALS
BODY MASS INDEX: 39.9 KG/M2 | OXYGEN SATURATION: 96 % | HEART RATE: 64 BPM | SYSTOLIC BLOOD PRESSURE: 124 MMHG | TEMPERATURE: 98.7 F | HEIGHT: 69 IN | DIASTOLIC BLOOD PRESSURE: 88 MMHG | WEIGHT: 269.4 LBS

## 2019-02-04 DIAGNOSIS — Z87.19 H/O RIGHT INGUINAL HERNIA REPAIR: ICD-10-CM

## 2019-02-04 DIAGNOSIS — R59.0 AXILLARY LYMPHADENOPATHY: Primary | ICD-10-CM

## 2019-02-04 DIAGNOSIS — Z98.890 H/O RIGHT INGUINAL HERNIA REPAIR: ICD-10-CM

## 2019-02-04 DIAGNOSIS — Z23 NEED FOR INFLUENZA VACCINATION: ICD-10-CM

## 2019-02-04 DIAGNOSIS — R10.31 RIGHT INGUINAL PAIN: ICD-10-CM

## 2019-02-04 PROBLEM — J30.9 ALLERGIC RHINITIS: Status: ACTIVE | Noted: 2019-02-04

## 2019-02-04 PROBLEM — T14.8XXA MUSCLE TEAR: Status: RESOLVED | Noted: 2018-07-30 | Resolved: 2019-02-04

## 2019-02-04 PROCEDURE — 3008F BODY MASS INDEX DOCD: CPT | Performed by: INTERNAL MEDICINE

## 2019-02-04 PROCEDURE — 99213 OFFICE O/P EST LOW 20 MIN: CPT | Performed by: INTERNAL MEDICINE

## 2019-02-04 RX ORDER — ACETAMINOPHEN 500 MG
1000 TABLET ORAL DAILY
COMMUNITY

## 2019-02-04 NOTE — ASSESSMENT & PLAN NOTE
No active infectious process appreciated at this time  Likely representing lymphadenopathy  Advised he contact office if symptoms persist past 1 month  Defer on imaging studies at this time

## 2019-02-04 NOTE — PROGRESS NOTES
Assessment/Plan:    Right inguinal pain  Will order an ultrasound of the pelvis for further evaluation  Axillary lymphadenopathy  No active infectious process appreciated at this time  Likely representing lymphadenopathy  Advised he contact office if symptoms persist past 1 month  Defer on imaging studies at this time  Diagnoses and all orders for this visit:    Axillary lymphadenopathy    Right inguinal pain  -     US pelvis complete non OB; Future    H/O right inguinal hernia repair  -     US pelvis complete non OB; Future    Other orders  -     acetaminophen (TYLENOL) 500 mg tablet; Take 1,000 mg by mouth daily          Subjective:      Patient ID: Laquita Rehman is a 59 y o  male  68-year-old male is seen today for acute symptoms of right axillary dull ache since yesterday  Ache is intermittent, denies any known aggravating factors  Has taken Tylenol with mild improvement of symptoms  Denies any erythema, swelling, or drainage from the affected area  Denies any recent trauma or exertional activity  He continues to have right suprapubic/inguinal tenderness  He was seen on 07/30/2018 for similar complaint however has noticed worsening of pain  He does have a history of right inguinal hernia repair (approximately 2012)  Arm Pain    The incident occurred 12 to 24 hours ago  There was no injury mechanism  Pain location: right axilla  The quality of the pain is described as aching  The pain does not radiate  The pain is at a severity of 6/10  The pain is moderate  The pain has been intermittent since the incident  Pertinent negatives include no chest pain, muscle weakness, numbness or tingling  Nothing aggravates the symptoms  He has tried acetaminophen for the symptoms  The treatment provided mild relief         The following portions of the patient's history were reviewed and updated as appropriate: allergies, current medications, past family history, past medical history, past social history, past surgical history and problem list     Review of Systems   Constitutional: Negative for activity change, appetite change, chills, diaphoresis, fatigue and fever  HENT: Negative for congestion, postnasal drip, rhinorrhea, sinus pain, sinus pressure, sneezing and sore throat  Eyes: Negative for visual disturbance  Respiratory: Negative for apnea, cough, choking, chest tightness, shortness of breath and wheezing  Cardiovascular: Negative for chest pain, palpitations and leg swelling  Gastrointestinal: Negative for abdominal distention, abdominal pain, anal bleeding, blood in stool, constipation, diarrhea, nausea and vomiting  Endocrine: Negative for cold intolerance and heat intolerance  Genitourinary: Negative for difficulty urinating, dysuria and hematuria  Musculoskeletal: Negative  Skin: Negative  Neurological: Negative for dizziness, tingling, weakness, light-headedness, numbness and headaches  Hematological: Negative for adenopathy  Psychiatric/Behavioral: Negative for agitation, sleep disturbance and suicidal ideas  All other systems reviewed and are negative  Past Medical History:   Diagnosis Date    Arthritis     Diverticulosis     History of colonic polyps     Vertigo          Current Outpatient Prescriptions:     acetaminophen (TYLENOL) 500 mg tablet, Take 1,000 mg by mouth daily, Disp: , Rfl:     aspirin 81 MG tablet, take 1 Tablet by Oral route  every day, Disp: , Rfl:     Multiple Vitamins-Minerals (MULTIVITAMIN PO), take 1 Tablet by Oral route  every day, Disp: , Rfl:     Allergies   Allergen Reactions    Pollen Extract Sneezing       Social History   Past Surgical History:   Procedure Laterality Date    HERNIA REPAIR  2012    KNEE SURGERY Bilateral      Family History   Problem Relation Age of Onset    Adopted:  Yes    No Known Problems Mother        Objective:  /88 (BP Location: Left arm, Patient Position: Sitting, Cuff Size: Large) Pulse 64   Temp 98 7 °F (37 1 °C) (Oral)   Ht 5' 8 5" (1 74 m)   Wt 122 kg (269 lb 6 4 oz)   SpO2 96% Comment: room air  BMI 40 37 kg/m²     No results found for this or any previous visit (from the past 1344 hour(s))  Physical Exam   Constitutional: He is oriented to person, place, and time  He appears well-developed and well-nourished  No distress  HENT:   Head: Normocephalic and atraumatic  Eyes: Pupils are equal, round, and reactive to light  Conjunctivae and EOM are normal  Right eye exhibits no discharge  Left eye exhibits no discharge  No scleral icterus  Neck: Normal range of motion  Neck supple  No JVD present  No thyromegaly present  Cardiovascular: Normal rate, regular rhythm, normal heart sounds and intact distal pulses  Exam reveals no gallop and no friction rub  No murmur heard  Pulmonary/Chest: Effort normal and breath sounds normal  No respiratory distress  He has no wheezes  He has no rales  He exhibits no tenderness  Abdominal: Soft  Bowel sounds are normal  He exhibits no distension and no mass  There is no tenderness  There is no rebound and no guarding  Musculoskeletal: Normal range of motion  He exhibits no edema, tenderness or deformity  Lymphadenopathy:     He has no cervical adenopathy  He has axillary adenopathy  Right axillary: Lateral adenopathy present  Neurological: He is alert and oriented to person, place, and time  He has normal reflexes  No cranial nerve deficit  Coordination normal    Skin: Skin is warm and dry  No rash noted  He is not diaphoretic  No erythema  No pallor  Psychiatric: He has a normal mood and affect  His behavior is normal  Judgment and thought content normal    Nursing note and vitals reviewed

## 2019-02-08 ENCOUNTER — HOSPITAL ENCOUNTER (OUTPATIENT)
Dept: RADIOLOGY | Facility: IMAGING CENTER | Age: 59
Discharge: HOME | End: 2019-02-08
Payer: COMMERCIAL

## 2019-02-08 DIAGNOSIS — Z98.890 H/O RIGHT INGUINAL HERNIA REPAIR: ICD-10-CM

## 2019-02-08 DIAGNOSIS — R10.31 RIGHT INGUINAL PAIN: ICD-10-CM

## 2019-02-08 DIAGNOSIS — Z87.19 H/O RIGHT INGUINAL HERNIA REPAIR: ICD-10-CM

## 2019-02-08 PROCEDURE — 76856 US EXAM PELVIC COMPLETE: CPT

## 2019-02-12 ENCOUNTER — TELEPHONE (OUTPATIENT)
Dept: INTERNAL MEDICINE CLINIC | Facility: CLINIC | Age: 59
End: 2019-02-12

## 2019-02-12 NOTE — TELEPHONE ENCOUNTER
Contacted patient regarding right groin/pelvic ultrasound, which was negative for acute findings  Recommendations are to undergo CT scan of the right groin if he continues to have persistent pain  For now, continue to monitor

## 2019-03-15 ENCOUNTER — OFFICE VISIT (OUTPATIENT)
Dept: INTERNAL MEDICINE CLINIC | Facility: CLINIC | Age: 59
End: 2019-03-15
Payer: COMMERCIAL

## 2019-03-15 VITALS
TEMPERATURE: 98.5 F | SYSTOLIC BLOOD PRESSURE: 122 MMHG | BODY MASS INDEX: 40.61 KG/M2 | HEART RATE: 70 BPM | OXYGEN SATURATION: 95 % | WEIGHT: 271 LBS | DIASTOLIC BLOOD PRESSURE: 80 MMHG

## 2019-03-15 DIAGNOSIS — M25.562 ACUTE PAIN OF LEFT KNEE: Primary | ICD-10-CM

## 2019-03-15 PROCEDURE — 99213 OFFICE O/P EST LOW 20 MIN: CPT | Performed by: INTERNAL MEDICINE

## 2019-03-15 PROCEDURE — 1036F TOBACCO NON-USER: CPT | Performed by: INTERNAL MEDICINE

## 2019-03-15 NOTE — PROGRESS NOTES
Assessment/Plan:    1  Left knee pain likely secondary to tendinitis/bursitis  Will prescribe Voltaren gel 3 times a day for couple of weeks  Also advised him to take over-the-counter ibuprofen 2 tablets 3 times a day with meals for 5-7 days  If no significant improvement in of week will request further imaging and probably will need local steroid injection  Diagnoses and all orders for this visit:    Acute pain of left knee  -     diclofenac sodium (VOLTAREN) 1 %; Apply 2 g topically 4 (four) times a day          Subjective:          Patient ID: Nika Umaña is a 59 y o  male  Patient came to the office with complaint of left knee which started about 5 days ago  Prior to that couple of days ago he started the stationary bike exercise and pain started after that  He did try over-the-counter Aleve and Motrin for few days with some relief  He still complaining of pain but it is not getting worse  He thinks that a slightly getting better  He does have a history of a bilateral knee arthroscopic surgeries for torn ligaments  The following portions of the patient's history were reviewed and updated as appropriate: allergies, current medications, past family history, past medical history, past social history, past surgical history and problem list     Review of Systems   Constitutional: Negative for fatigue and fever  HENT: Negative for congestion, ear discharge, ear pain, postnasal drip, sinus pressure, sore throat, tinnitus and trouble swallowing  Eyes: Negative for discharge, itching and visual disturbance  Respiratory: Negative for cough and shortness of breath  Cardiovascular: Negative for chest pain and palpitations  Gastrointestinal: Negative for abdominal pain, diarrhea, nausea and vomiting  Endocrine: Negative for cold intolerance and polyuria  Genitourinary: Negative for difficulty urinating, dysuria and urgency  Musculoskeletal: Positive for arthralgias   Negative for neck pain  Skin: Negative for rash  Allergic/Immunologic: Negative for environmental allergies  Neurological: Negative for dizziness, weakness and headaches  Psychiatric/Behavioral: The patient is not nervous/anxious  Past Medical History:   Diagnosis Date    Arthritis     Diverticulosis     History of colonic polyps     Vertigo          Current Outpatient Medications:     acetaminophen (TYLENOL) 500 mg tablet, Take 1,000 mg by mouth daily, Disp: , Rfl:     aspirin 81 MG tablet, take 1 Tablet by Oral route  every day, Disp: , Rfl:     Multiple Vitamins-Minerals (MULTIVITAMIN PO), take 1 Tablet by Oral route  every day, Disp: , Rfl:     diclofenac sodium (VOLTAREN) 1 %, Apply 2 g topically 4 (four) times a day, Disp: 1 Tube, Rfl: 0    Allergies   Allergen Reactions    Pollen Extract Sneezing       Social History   Past Surgical History:   Procedure Laterality Date    HERNIA REPAIR  2012    KNEE SURGERY Bilateral      Family History   Adopted: Yes   Problem Relation Age of Onset    No Known Problems Mother        Objective:  /80 (BP Location: Left arm, Patient Position: Sitting, Cuff Size: Large)   Pulse 70   Temp 98 5 °F (36 9 °C) (Oral)   Wt 123 kg (271 lb)   SpO2 95%   BMI 40 61 kg/m²   Body mass index is 40 61 kg/m²  Physical Exam   Constitutional: He appears well-developed  HENT:   Head: Normocephalic  Mouth/Throat: Oropharynx is clear and moist    Eyes: Pupils are equal, round, and reactive to light  No scleral icterus  Neck: Normal range of motion  Neck supple  No tracheal deviation present  No thyromegaly present  Cardiovascular: Normal rate, regular rhythm and normal heart sounds  Pulmonary/Chest: Effort normal and breath sounds normal  No respiratory distress  He exhibits no tenderness  Abdominal: Soft  Bowel sounds are normal  He exhibits no mass  There is no tenderness  Musculoskeletal: Normal range of motion  He exhibits tenderness     Moderate tenderness over medial aspect of left knee noted  No calf tenderness  Lymphadenopathy:     He has no cervical adenopathy  Neurological: He is alert  No cranial nerve deficit  Skin: Skin is warm  Psychiatric: He has a normal mood and affect

## 2019-05-24 DIAGNOSIS — M25.562 ACUTE PAIN OF LEFT KNEE: ICD-10-CM

## 2020-01-13 ENCOUNTER — OFFICE VISIT (OUTPATIENT)
Dept: INTERNAL MEDICINE CLINIC | Facility: CLINIC | Age: 60
End: 2020-01-13
Payer: COMMERCIAL

## 2020-01-13 VITALS
DIASTOLIC BLOOD PRESSURE: 78 MMHG | TEMPERATURE: 98.8 F | BODY MASS INDEX: 41.15 KG/M2 | SYSTOLIC BLOOD PRESSURE: 122 MMHG | OXYGEN SATURATION: 96 % | HEART RATE: 68 BPM | WEIGHT: 277.8 LBS | HEIGHT: 69 IN

## 2020-01-13 DIAGNOSIS — Z11.59 NEED FOR HEPATITIS C SCREENING TEST: ICD-10-CM

## 2020-01-13 DIAGNOSIS — J06.9 VIRAL URI WITH COUGH: Primary | ICD-10-CM

## 2020-01-13 PROCEDURE — 99213 OFFICE O/P EST LOW 20 MIN: CPT | Performed by: NURSE PRACTITIONER

## 2020-01-13 PROCEDURE — 1036F TOBACCO NON-USER: CPT | Performed by: NURSE PRACTITIONER

## 2020-01-13 PROCEDURE — 3008F BODY MASS INDEX DOCD: CPT | Performed by: NURSE PRACTITIONER

## 2020-01-13 NOTE — PATIENT INSTRUCTIONS
Advised to start taking Flonase nasal spray to help open nasal passages and sinuses  This can be used up to two sprays in each nostril daily, whether it is one spray in each nostril in the morning and evening, or twice once per day  Once acute symptoms have improved, may continue to use at dosing of one spray in each nostril daily if symptoms appear to have an allergic component  This medication does work best when taken consistently for allergic-type symptoms  Mucinex DM is also recommended at this time  It will help thin secretions and decrease cough  Drink plenty of fluids with this, as this is how it works to best thin secretions  Additionally, warm steam inhalations may help to loosen secretions and mucous, as well  As discussed, this illness appears to be viral in nature  Symptomatic treatment is advised at this time  Tylenol and ibuprofen can be helpful in alleviating fevers and body aches  Rest and drinking plenty of fluids was also advised  Educated that these symptoms can be bothersome and last up to 2-4 weeks, at times  If they suddenly worsen or change, or continue to last longer than that period of time, it is important to return for reevaluation  Advised to go to the ER if chest pain or shortness of breath suddenly develops or fevers over 102 degrees that are not lowered with tylenol or motrin start  Return sooner with any new or continued concerns

## 2020-01-13 NOTE — ASSESSMENT & PLAN NOTE
Patient counseled, at length, that this is does not appear bacterial in nature, and therefore does not necessitate antibiotics  Start flonase and Mucinex DM for symptom relief  Other supportive care measures reviewed  Likely course of illness reviewed with patient  Return if symptoms worsen or fail to improve

## 2020-01-13 NOTE — PROGRESS NOTES
Assessment/Plan:       Problem List Items Addressed This Visit        Respiratory    Viral URI with cough - Primary     Patient counseled, at length, that this is does not appear bacterial in nature, and therefore does not necessitate antibiotics  Start flonase and Mucinex DM for symptom relief  Other supportive care measures reviewed  Likely course of illness reviewed with patient  Return if symptoms worsen or fail to improve  Other Visit Diagnoses     Need for hepatitis C screening test        Relevant Orders    Hepatitis C antibody            BMI Counseling: Body mass index is 41 62 kg/m²  The BMI is above normal  Nutrition recommendations include encouraging healthy choices of fruits and vegetables  Subjective:      Patient ID: Mago Power is a 62 y o  male  Pt presents for an acute visit  He reports that for the past 1 5 days, he has been experiencing a sinus headache, coughing up yellow/green mucous, and mild body aches, as well as fatigue  He has not had any fevers or chills  He denies sore throat, but does report that it is slightly irritated  He denies abdominal pain, N/V/D  He did not get a flu shot this year  He has sick contacts at work  Sinusitis   This is a new problem  The current episode started in the past 7 days  The problem has been gradually worsening since onset  There has been no fever  The pain is moderate  Associated symptoms include coughing, headaches, a hoarse voice, sinus pressure, sneezing (at baseline separate of illness) and swollen glands  Pertinent negatives include no chills, congestion, diaphoresis, ear pain, neck pain, shortness of breath or sore throat  Treatments tried: Magy Yamil Cold Day and Night time  The treatment provided moderate relief         The following portions of the patient's history were reviewed and updated as appropriate: allergies, current medications, past family history, past medical history, past social history, past surgical history and problem list     Review of Systems   Constitutional: Negative for chills and diaphoresis  HENT: Positive for hearing loss (at baseline, mild, ongoing, not r/t illness), hoarse voice, postnasal drip, sinus pressure, sinus pain and sneezing (at baseline separate of illness)  Negative for congestion, ear pain, nosebleeds, rhinorrhea, sore throat and trouble swallowing  Respiratory: Positive for cough and wheezing (maybe last night, per patient)  Negative for shortness of breath  Cardiovascular: Negative for chest pain  Gastrointestinal: Negative for abdominal pain, diarrhea, nausea and vomiting  Genitourinary: Negative for dysuria  Musculoskeletal: Positive for myalgias  Negative for neck pain  Skin: Negative for rash  Neurological: Positive for headaches  Negative for dizziness  Psychiatric/Behavioral: Negative for sleep disturbance           Past Medical History:   Diagnosis Date    Arthritis     Diverticulosis     History of colonic polyps     Vertigo          Current Outpatient Medications:     acetaminophen (TYLENOL) 500 mg tablet, Take 1,000 mg by mouth daily, Disp: , Rfl:     aspirin 81 MG tablet, take 1 Tablet by Oral route  every day, Disp: , Rfl:     diclofenac sodium (VOLTAREN) 1 %, Apply 2 g topically 4 (four) times a day, Disp: 1 Tube, Rfl: 1    Multiple Vitamins-Minerals (MULTIVITAMIN PO), take 1 Tablet by Oral route  every day, Disp: , Rfl:     Allergies   Allergen Reactions    Pollen Extract Sneezing       Social History   Past Surgical History:   Procedure Laterality Date    HERNIA REPAIR  2012    KNEE SURGERY Bilateral      Family History   Adopted: Yes   Problem Relation Age of Onset    No Known Problems Mother        Objective:  /78 (BP Location: Left arm, Patient Position: Sitting, Cuff Size: Large)   Pulse 68   Temp 98 8 °F (37 1 °C) (Oral)   Ht 5' 8 5" (1 74 m)   Wt 126 kg (277 lb 12 8 oz)   SpO2 96%   BMI 41 62 kg/m² Physical Exam   Constitutional: He is oriented to person, place, and time  He appears well-developed and well-nourished  No distress  HENT:   Head: Normocephalic and atraumatic  Right Ear: Tympanic membrane is bulging  Tympanic membrane is not erythematous  Left Ear: Tympanic membrane is bulging  Tympanic membrane is not erythematous  Nose: No mucosal edema or rhinorrhea  Right sinus exhibits no maxillary sinus tenderness and no frontal sinus tenderness  Left sinus exhibits no maxillary sinus tenderness and no frontal sinus tenderness  Mouth/Throat: Uvula is midline and mucous membranes are normal  No posterior oropharyngeal erythema  Eyes: No scleral icterus  Cardiovascular: Normal rate and regular rhythm  Pulmonary/Chest: Effort normal and breath sounds normal  He has no wheezes  Mild cough noted during exam   Abdominal: Soft  Musculoskeletal: He exhibits no edema  Lymphadenopathy:     He has cervical adenopathy (mild)  Neurological: He is alert and oriented to person, place, and time  Skin: Skin is warm  Psychiatric: He has a normal mood and affect